# Patient Record
Sex: FEMALE | Race: OTHER
[De-identification: names, ages, dates, MRNs, and addresses within clinical notes are randomized per-mention and may not be internally consistent; named-entity substitution may affect disease eponyms.]

---

## 2018-01-30 ENCOUNTER — APPOINTMENT (OUTPATIENT)
Dept: CARDIOLOGY | Facility: CLINIC | Age: 23
End: 2018-01-30

## 2019-01-23 ENCOUNTER — INPATIENT (INPATIENT)
Facility: HOSPITAL | Age: 24
LOS: 0 days | Discharge: HOME | DRG: 202 | End: 2019-01-24
Attending: INTERNAL MEDICINE | Admitting: INTERNAL MEDICINE
Payer: SELF-PAY

## 2019-01-23 VITALS
OXYGEN SATURATION: 97 % | SYSTOLIC BLOOD PRESSURE: 120 MMHG | WEIGHT: 179.9 LBS | HEART RATE: 112 BPM | HEIGHT: 67 IN | RESPIRATION RATE: 30 BRPM | DIASTOLIC BLOOD PRESSURE: 77 MMHG

## 2019-01-23 DIAGNOSIS — J45.901 UNSPECIFIED ASTHMA WITH (ACUTE) EXACERBATION: ICD-10-CM

## 2019-01-23 DIAGNOSIS — Z90.49 ACQUIRED ABSENCE OF OTHER SPECIFIED PARTS OF DIGESTIVE TRACT: Chronic | ICD-10-CM

## 2019-01-23 LAB
ALBUMIN SERPL ELPH-MCNC: 4.3 G/DL — SIGNIFICANT CHANGE UP (ref 3.3–5.2)
ALP SERPL-CCNC: 85 U/L — SIGNIFICANT CHANGE UP (ref 40–120)
ALT FLD-CCNC: 13 U/L — SIGNIFICANT CHANGE UP
ANION GAP SERPL CALC-SCNC: 15 MMOL/L — SIGNIFICANT CHANGE UP (ref 5–17)
AST SERPL-CCNC: 19 U/L — SIGNIFICANT CHANGE UP
BASOPHILS # BLD AUTO: 0.1 K/UL — SIGNIFICANT CHANGE UP (ref 0–0.2)
BASOPHILS NFR BLD AUTO: 1 % — SIGNIFICANT CHANGE UP (ref 0–2)
BILIRUB SERPL-MCNC: 0.2 MG/DL — LOW (ref 0.4–2)
BUN SERPL-MCNC: 15 MG/DL — SIGNIFICANT CHANGE UP (ref 8–20)
CALCIUM SERPL-MCNC: 8.3 MG/DL — LOW (ref 8.6–10.2)
CHLORIDE SERPL-SCNC: 104 MMOL/L — SIGNIFICANT CHANGE UP (ref 98–107)
CO2 SERPL-SCNC: 19 MMOL/L — LOW (ref 22–29)
CREAT SERPL-MCNC: 0.74 MG/DL — SIGNIFICANT CHANGE UP (ref 0.5–1.3)
EOSINOPHIL # BLD AUTO: 2.7 K/UL — HIGH (ref 0–0.5)
EOSINOPHIL NFR BLD AUTO: 13 % — HIGH (ref 0–5)
ETHANOL SERPL-MCNC: <10 MG/DL — SIGNIFICANT CHANGE UP
GAS PNL BLDA: SIGNIFICANT CHANGE UP
GLUCOSE SERPL-MCNC: 198 MG/DL — HIGH (ref 70–115)
HCG SERPL-ACNC: <4 MIU/ML — SIGNIFICANT CHANGE UP
HCT VFR BLD CALC: 42.6 % — SIGNIFICANT CHANGE UP (ref 37–47)
HGB BLD-MCNC: 14.5 G/DL — SIGNIFICANT CHANGE UP (ref 12–16)
LACTATE BLDV-MCNC: 2.9 MMOL/L — HIGH (ref 0.5–2)
LYMPHOCYTES # BLD AUTO: 39 % — SIGNIFICANT CHANGE UP (ref 20–55)
LYMPHOCYTES # BLD AUTO: 7.9 K/UL — HIGH (ref 1–4.8)
MCHC RBC-ENTMCNC: 28.9 PG — SIGNIFICANT CHANGE UP (ref 27–31)
MCHC RBC-ENTMCNC: 34 G/DL — SIGNIFICANT CHANGE UP (ref 32–36)
MCV RBC AUTO: 84.9 FL — SIGNIFICANT CHANGE UP (ref 81–99)
MONOCYTES # BLD AUTO: 1.2 K/UL — HIGH (ref 0–0.8)
MONOCYTES NFR BLD AUTO: 6 % — SIGNIFICANT CHANGE UP (ref 3–10)
NEUTROPHILS # BLD AUTO: 8.2 K/UL — HIGH (ref 1.8–8)
NEUTROPHILS NFR BLD AUTO: 41 % — SIGNIFICANT CHANGE UP (ref 37–73)
PLAT MORPH BLD: NORMAL — SIGNIFICANT CHANGE UP
PLATELET # BLD AUTO: 333 K/UL — SIGNIFICANT CHANGE UP (ref 150–400)
POTASSIUM SERPL-MCNC: 3.9 MMOL/L — SIGNIFICANT CHANGE UP (ref 3.5–5.3)
POTASSIUM SERPL-SCNC: 3.9 MMOL/L — SIGNIFICANT CHANGE UP (ref 3.5–5.3)
PROT SERPL-MCNC: 7.4 G/DL — SIGNIFICANT CHANGE UP (ref 6.6–8.7)
RAPID RVP RESULT: SIGNIFICANT CHANGE UP
RBC # BLD: 5.02 M/UL — SIGNIFICANT CHANGE UP (ref 4.4–5.2)
RBC # FLD: 13.2 % — SIGNIFICANT CHANGE UP (ref 11–15.6)
RBC BLD AUTO: NORMAL — SIGNIFICANT CHANGE UP
SALICYLATES SERPL-MCNC: <0.6 MG/DL — LOW (ref 10–20)
SODIUM SERPL-SCNC: 138 MMOL/L — SIGNIFICANT CHANGE UP (ref 135–145)
WBC # BLD: 20.4 K/UL — HIGH (ref 4.8–10.8)
WBC # FLD AUTO: 20.4 K/UL — HIGH (ref 4.8–10.8)

## 2019-01-23 PROCEDURE — 93010 ELECTROCARDIOGRAM REPORT: CPT

## 2019-01-23 PROCEDURE — 99223 1ST HOSP IP/OBS HIGH 75: CPT

## 2019-01-23 RX ORDER — KETOROLAC TROMETHAMINE 30 MG/ML
15 SYRINGE (ML) INJECTION ONCE
Qty: 0 | Refills: 0 | Status: DISCONTINUED | OUTPATIENT
Start: 2019-01-23 | End: 2019-01-23

## 2019-01-23 RX ORDER — IPRATROPIUM/ALBUTEROL SULFATE 18-103MCG
3 AEROSOL WITH ADAPTER (GRAM) INHALATION EVERY 6 HOURS
Qty: 0 | Refills: 0 | Status: DISCONTINUED | OUTPATIENT
Start: 2019-01-23 | End: 2019-01-24

## 2019-01-23 RX ORDER — IPRATROPIUM/ALBUTEROL SULFATE 18-103MCG
3 AEROSOL WITH ADAPTER (GRAM) INHALATION ONCE
Qty: 0 | Refills: 0 | Status: COMPLETED | OUTPATIENT
Start: 2019-01-23 | End: 2019-01-23

## 2019-01-23 RX ORDER — SODIUM CHLORIDE 9 MG/ML
2000 INJECTION INTRAMUSCULAR; INTRAVENOUS; SUBCUTANEOUS ONCE
Qty: 0 | Refills: 0 | Status: COMPLETED | OUTPATIENT
Start: 2019-01-23 | End: 2019-01-23

## 2019-01-23 RX ORDER — EPINEPHRINE 0.3 MG/.3ML
0.3 INJECTION INTRAMUSCULAR; SUBCUTANEOUS ONCE
Qty: 0 | Refills: 0 | Status: COMPLETED | OUTPATIENT
Start: 2019-01-23 | End: 2019-01-23

## 2019-01-23 RX ORDER — ALBUTEROL 90 UG/1
2.5 AEROSOL, METERED ORAL EVERY 4 HOURS
Qty: 0 | Refills: 0 | Status: DISCONTINUED | OUTPATIENT
Start: 2019-01-23 | End: 2019-01-24

## 2019-01-23 RX ADMIN — Medication 3 MILLILITER(S): at 20:47

## 2019-01-23 RX ADMIN — SODIUM CHLORIDE 2000 MILLILITER(S): 9 INJECTION INTRAMUSCULAR; INTRAVENOUS; SUBCUTANEOUS at 15:42

## 2019-01-23 RX ADMIN — Medication 40 MILLIGRAM(S): at 18:03

## 2019-01-23 RX ADMIN — EPINEPHRINE 0.3 MILLIGRAM(S): 0.3 INJECTION INTRAMUSCULAR; SUBCUTANEOUS at 14:30

## 2019-01-23 RX ADMIN — SODIUM CHLORIDE 2000 MILLILITER(S): 9 INJECTION INTRAMUSCULAR; INTRAVENOUS; SUBCUTANEOUS at 14:42

## 2019-01-23 RX ADMIN — Medication 3 MILLILITER(S): at 14:26

## 2019-01-23 RX ADMIN — Medication 40 MILLIGRAM(S): at 23:49

## 2019-01-23 RX ADMIN — Medication 3 MILLILITER(S): at 18:03

## 2019-01-23 NOTE — ED PROVIDER NOTE - MEDICAL DECISION MAKING DETAILS
patient with agonal breathing on arrival, significant wheeze, given 2nd dose IM Epi, duoneb, patient became more responsive while preparing for intubation, states 'you are intubating me' trying to use phone. trial BIPAP as patient responding to medical interventions. protecting airway. ABG. labs. monitor.

## 2019-01-23 NOTE — ED ADULT NURSE NOTE - OBJECTIVE STATEMENT
pt BIBA awake, altered, in respiratory distress, given Duoneb, 0.3mg IM Epi, 125mg solumedrol IV as well as 2mg Mag IVPB by EMS. pt with hx asthma, ICU admission for asthma exacerbation. denies intubation history. pt with no recent travel, no recent illness or fevers. pt with increased work of breathing, ER MD brought to  bedside as well as RT.

## 2019-01-23 NOTE — H&P ADULT - NSHPPHYSICALEXAM_GEN_ALL_CORE
T(C): 36.4 (01-23-19 @ 14:39), Max: 36.4 (01-23-19 @ 14:39)  HR: 106 (01-23-19 @ 17:03) (102 - 120)  BP: 111/52 (01-23-19 @ 16:06) (111/52 - 146/82)  RR: 14 (01-23-19 @ 17:03) (12 - 34)  SpO2: 98% (01-23-19 @ 17:03) (97% - 100%)    GEN - appears age appropriate. well nourished. pleasant. no distress.   HEENT - NCAT, EOMI, PRATIMA  RESP - diffuse course rhonchi with scattered prominent wheeze, no stridor/rhonchi/crackles. not on supplemental O2.  CARDIO - NS1S2, RRR. No murmurs/rubs/gallops.  ABD - Soft/Non tender/Non distended. Normal BS x4 quadrants.   Ext - No RADAMES. no signs of venous/arterial stasis ulcers  MSK - full ROM of BL upper and lower extremities without pain or restriction. BL 5/5 strength on upper and lower extremities.   Neuro - cn 2-12 grossly intact. o visible seizure activity appreciated. no tremor. gait not observed.   Skin - clean, dry, intact. no rashes or lesions.    Psych- AAOx3. no suicidal/homicidal ideation. appropriate behaviour. attentive. normal affect.

## 2019-01-23 NOTE — ED PROVIDER NOTE - OBJECTIVE STATEMENT
29yo F with h/o asthma BIBEMS for exacerbation, received 2mg Mg, 125mg solumedrol, Epi IM x1 and duoneb x2, more lethargic now than on .     Pt minimally resposnive, admits to ICU admissions, denies intubations. no other PMH

## 2019-01-23 NOTE — H&P ADULT - ASSESSMENT
30yoF w/ pmh mild-mod persistent asthma presenting w/ acute asthma exac.    #acute exac of mild/mod persistent asthma.  multifactorial, sec to combo of suspected underlying viral uri, chemical irritation from cleaning products, sudden entry into cold air  sp mg iv + solumedrol load by EMS  iv steroids, taper as tolerated  cxr unremarkable, check rvp  nebs prn + standing  supplemental o2 prn, not needed for now  discussed @ length importance of asthma action plan  rescue inhaler to be prescribed on dc, pt only w/ neb machine + symbicort at home  pulm referral on dc, not seen in 2-3 yrs    #metabolic acidosis  noted on abg, unexplained? expecting resp acidosis in setting of asthma exac  pending acetaminophen + salicylate levels  repeat abg, lactate    #dvt ppx. not needed, low risk, encourage ambulation    #dispo. dc home when exac resolved. 30yoF w/ pmh mild-mod persistent asthma presenting w/ acute asthma exac.    #acute exac of mild/mod persistent asthma.  multifactorial, sec to combo of suspected underlying viral uri, chemical irritation from cleaning products, sudden entry into cold air  sp mg iv + solumedrol load by EMS  iv steroids, taper as tolerated  cxr unremarkable, check rvp  nebs prn + standing  supplemental o2 prn, not needed for now  discussed @ length importance of asthma action plan  rescue inhaler to be prescribed on dc, pt only w/ neb machine + symbicort at home  pulm referral on dc, not seen in 2-3 yrs    #metabolic acidosis  noted on abg, unexplained? expecting resp acidosis in setting of asthma exac  pending acetaminophen + salicylate levels  repeat abg, lactate    #leukocytosis + hyperglycemia.   asymptomatic  likely sec to large steroid load given in ems  trend    #dvt ppx. not needed, low risk, encourage ambulation    #dispo. dc home when exac resolved.

## 2019-01-23 NOTE — H&P ADULT - HISTORY OF PRESENT ILLNESS
30yoF w/ pmh mild-mod persistent asthma presenting w/ acute asthma exac. 2 days ago she began experiencing cough assoc w/ nasal congestion, mild upper back pain, odynophagia and sob at which time she began increased use of her albuterol inhaler. on the day of admission was cleaning her home when the scent of the chemicals began to potentiate the sob that she was already having. she attempted to use her neb again but to no avail prompting call to EMS. upon exiting the home to go to the stretcher pt became acutely dyspneic w/ worsening bronchospasm and was bagged by ems to maintain oxygenation. was given iv access immediately and given 2gm mg + 125mg of solumedrol. in er pt was placed on bipap x1hr and given additional nebs. bipap removed and patient continued to improve. she denies fevers, admits to chills, denies recent sick contacts. states that she did get the flu shot this season. denies chest pain, palpitations. admits to nausea w/o diarrhea or vomiting. denies weakness, fatigue. last asthma exac 12/2018 treated @ [facility], prior to that last hospitalization for exac was 4-5yrs ago. no hx of intubation.

## 2019-01-23 NOTE — H&P ADULT - NSHPLABSRESULTS_GEN_ALL_CORE
01-23    138  |  104  |  15.0  ----------------------------<  198<H>  3.9   |  19.0<L>  |  0.74    Ca    8.3<L>      23 Jan 2019 14:13    TPro  7.4  /  Alb  4.3  /  TBili  0.2<L>  /  DBili  x   /  AST  19  /  ALT  13  /  AlkPhos  85  01-23                          14.5   20.4  )-----------( 333      ( 23 Jan 2019 14:13 )             42.6     LIVER FUNCTIONS - ( 23 Jan 2019 14:13 )  Alb: 4.3 g/dL / Pro: 7.4 g/dL / ALK PHOS: 85 U/L / ALT: 13 U/L / AST: 19 U/L / GGT: x    Blood Gas Arterial, Lactate (01.23.19 @ 14:15)    Blood Gas Arterial, Lactate: 2.5 mmoL/L    Blood Gas Profile - Arterial (01.23.19 @ 14:15)    pH, Arterial: 7.24    pCO2, Arterial: 44 mmHg    pO2, Arterial: 214 mmHg    HCO3, Arterial: 18 mmoL/L    Base Excess, Arterial: -8.4 mmol/L    Oxygen Saturation, Arterial: 100 %    FIO2, Arterial: 6 lpm    Blood Gas Comments Arterial: dn 6 lpm    Blood Gas Source Arterial: Arterial

## 2019-01-23 NOTE — H&P ADULT - NSHPSOCIALHISTORY_GEN_ALL_CORE
Family HX:       Mother - no hx asthma       Father - no hx asthma    Denies tobacco use, social ETOH use, admits to former marijuana use, last smoked last summer, denies other illicit drug/misuse of prescription medications

## 2019-01-23 NOTE — ED PROVIDER NOTE - PHYSICAL EXAMINATION
Gen: moderate resp distress, agonal breathing, somnolent, responds to physical stimuli  Head: NCAT  HEENT: EOMI, oral mucosa moist, normal conjunctiva, neck supple  Lung: tachypneic, moderate air entry, diffuse wheeze  CV: tachy regular  Abd: soft, NTND  MSK: No edema, no visible deformities  Neuro: moving all extremities   Skin: No rash

## 2019-01-23 NOTE — H&P ADULT - ATTENDING COMMENTS
PMD contacted and made aware of hospital admittance . Follow up appointment advised within 7 days of discharge, per PMD she can be seen as walk in to accommodate her    At this time the patient is in a hemodynamic state that requires hospital level of care/treatment. This has been explained to the patient and they are agreeable to the terms of admittance and continued care.

## 2019-01-23 NOTE — ED PROVIDER NOTE - PROGRESS NOTE DETAILS
patient improved MS, decreased WOB on BIPAP, ABG no hypercapnea, mild met acidosis. will trial off BIPAP -Slowey DO doing well off BIPAP, awake and alert, on phone, no distress, still with wheeze but improved aeration. no resp distress -Slowey DO remains stable, has not required any supplemental O2, complaint of mild chest tightness, no SOB. still with faint wheeze, will give duoneb q4 admit to pulse ox floor -Slowey DO lactate elevated in setting of 2 doses of epinephrine -Michelle DO

## 2019-01-23 NOTE — ED ADULT NURSE NOTE - NSIMPLEMENTINTERV_GEN_ALL_ED
Implemented All Universal Safety Interventions:  [place] to call system. Call bell, personal items and telephone within reach. Instruct patient to call for assistance. Room bathroom lighting operational. Non-slip footwear when patient is off stretcher. Physically safe environment: no spills, clutter or unnecessary equipment. Stretcher in lowest position, wheels locked, appropriate side rails in place.

## 2019-01-23 NOTE — ED ADULT TRIAGE NOTE - CHIEF COMPLAINT QUOTE
sudden onset asthma exac, as per EMS was given epi, solumedrol, magnesium, and duonebs with no relief, code team and respiratory at bedside upon ED arrival, preparing for intubation

## 2019-01-24 VITALS
DIASTOLIC BLOOD PRESSURE: 54 MMHG | RESPIRATION RATE: 18 BRPM | SYSTOLIC BLOOD PRESSURE: 91 MMHG | OXYGEN SATURATION: 94 % | TEMPERATURE: 99 F | HEART RATE: 118 BPM

## 2019-01-24 LAB
ANISOCYTOSIS BLD QL: SLIGHT — SIGNIFICANT CHANGE UP
ELLIPTOCYTES BLD QL SMEAR: SLIGHT — SIGNIFICANT CHANGE UP
HCT VFR BLD CALC: 38.3 % — SIGNIFICANT CHANGE UP (ref 37–47)
HGB BLD-MCNC: 13.2 G/DL — SIGNIFICANT CHANGE UP (ref 12–16)
LACTATE BLDV-MCNC: 0.9 MMOL/L — SIGNIFICANT CHANGE UP (ref 0.5–2)
LYMPHOCYTES # BLD AUTO: 5 % — LOW (ref 20–55)
MACROCYTES BLD QL: SLIGHT — SIGNIFICANT CHANGE UP
MCHC RBC-ENTMCNC: 28.2 PG — SIGNIFICANT CHANGE UP (ref 27–31)
MCHC RBC-ENTMCNC: 34.5 G/DL — SIGNIFICANT CHANGE UP (ref 32–36)
MCV RBC AUTO: 81.8 FL — SIGNIFICANT CHANGE UP (ref 81–99)
MICROCYTES BLD QL: SLIGHT — SIGNIFICANT CHANGE UP
MONOCYTES NFR BLD AUTO: 3 % — SIGNIFICANT CHANGE UP (ref 3–10)
NEUTROPHILS NFR BLD AUTO: 91 % — HIGH (ref 37–73)
PLAT MORPH BLD: NORMAL — SIGNIFICANT CHANGE UP
PLATELET # BLD AUTO: 318 K/UL — SIGNIFICANT CHANGE UP (ref 150–400)
POIKILOCYTOSIS BLD QL AUTO: SLIGHT — SIGNIFICANT CHANGE UP
RBC # BLD: 4.68 M/UL — SIGNIFICANT CHANGE UP (ref 4.4–5.2)
RBC # FLD: 13.2 % — SIGNIFICANT CHANGE UP (ref 11–15.6)
RBC BLD AUTO: ABNORMAL
VARIANT LYMPHS # BLD: 1 % — SIGNIFICANT CHANGE UP (ref 0–6)
WBC # BLD: 21.6 K/UL — HIGH (ref 4.8–10.8)
WBC # FLD AUTO: 21.6 K/UL — HIGH (ref 4.8–10.8)

## 2019-01-24 PROCEDURE — 71045 X-RAY EXAM CHEST 1 VIEW: CPT

## 2019-01-24 PROCEDURE — 96361 HYDRATE IV INFUSION ADD-ON: CPT

## 2019-01-24 PROCEDURE — 82803 BLOOD GASES ANY COMBINATION: CPT

## 2019-01-24 PROCEDURE — 82330 ASSAY OF CALCIUM: CPT

## 2019-01-24 PROCEDURE — 87633 RESP VIRUS 12-25 TARGETS: CPT

## 2019-01-24 PROCEDURE — 84295 ASSAY OF SERUM SODIUM: CPT

## 2019-01-24 PROCEDURE — 94660 CPAP INITIATION&MGMT: CPT

## 2019-01-24 PROCEDURE — 84702 CHORIONIC GONADOTROPIN TEST: CPT

## 2019-01-24 PROCEDURE — 96372 THER/PROPH/DIAG INJ SC/IM: CPT | Mod: XU

## 2019-01-24 PROCEDURE — 85014 HEMATOCRIT: CPT

## 2019-01-24 PROCEDURE — 85027 COMPLETE CBC AUTOMATED: CPT

## 2019-01-24 PROCEDURE — 93005 ELECTROCARDIOGRAM TRACING: CPT

## 2019-01-24 PROCEDURE — 82947 ASSAY GLUCOSE BLOOD QUANT: CPT

## 2019-01-24 PROCEDURE — 99239 HOSP IP/OBS DSCHRG MGMT >30: CPT

## 2019-01-24 PROCEDURE — 83605 ASSAY OF LACTIC ACID: CPT

## 2019-01-24 PROCEDURE — 80307 DRUG TEST PRSMV CHEM ANLYZR: CPT

## 2019-01-24 PROCEDURE — 84132 ASSAY OF SERUM POTASSIUM: CPT

## 2019-01-24 PROCEDURE — 87798 DETECT AGENT NOS DNA AMP: CPT

## 2019-01-24 PROCEDURE — 87581 M.PNEUMON DNA AMP PROBE: CPT

## 2019-01-24 PROCEDURE — 36415 COLL VENOUS BLD VENIPUNCTURE: CPT

## 2019-01-24 PROCEDURE — 87486 CHLMYD PNEUM DNA AMP PROBE: CPT

## 2019-01-24 PROCEDURE — 94640 AIRWAY INHALATION TREATMENT: CPT

## 2019-01-24 PROCEDURE — 82435 ASSAY OF BLOOD CHLORIDE: CPT

## 2019-01-24 PROCEDURE — 99291 CRITICAL CARE FIRST HOUR: CPT | Mod: 25

## 2019-01-24 PROCEDURE — 80053 COMPREHEN METABOLIC PANEL: CPT

## 2019-01-24 PROCEDURE — 96374 THER/PROPH/DIAG INJ IV PUSH: CPT

## 2019-01-24 PROCEDURE — 82962 GLUCOSE BLOOD TEST: CPT

## 2019-01-24 RX ORDER — IPRATROPIUM/ALBUTEROL SULFATE 18-103MCG
3 AEROSOL WITH ADAPTER (GRAM) INHALATION
Qty: 360 | Refills: 0 | OUTPATIENT
Start: 2019-01-24 | End: 2019-01-28

## 2019-01-24 RX ORDER — SODIUM CHLORIDE 9 MG/ML
2000 INJECTION INTRAMUSCULAR; INTRAVENOUS; SUBCUTANEOUS ONCE
Qty: 0 | Refills: 0 | Status: COMPLETED | OUTPATIENT
Start: 2019-01-24 | End: 2019-01-24

## 2019-01-24 RX ORDER — ALBUTEROL 90 UG/1
2 AEROSOL, METERED ORAL
Qty: 1 | Refills: 0 | OUTPATIENT
Start: 2019-01-24 | End: 2019-02-22

## 2019-01-24 RX ADMIN — SODIUM CHLORIDE 500 MILLILITER(S): 9 INJECTION INTRAMUSCULAR; INTRAVENOUS; SUBCUTANEOUS at 11:58

## 2019-01-24 RX ADMIN — Medication 40 MILLIGRAM(S): at 17:26

## 2019-01-24 RX ADMIN — Medication 3 MILLILITER(S): at 15:22

## 2019-01-24 RX ADMIN — Medication 3 MILLILITER(S): at 03:50

## 2019-01-24 RX ADMIN — Medication 40 MILLIGRAM(S): at 05:21

## 2019-01-24 RX ADMIN — Medication 40 MILLIGRAM(S): at 11:58

## 2019-01-24 NOTE — DISCHARGE NOTE ADULT - CARE PROVIDERS DIRECT ADDRESSES
[email or web address]

## 2019-01-24 NOTE — DISCHARGE NOTE ADULT - CARE PLAN
Principal Discharge DX:	Moderate persistent asthma with acute exacerbation  Goal:	stable  Assessment and plan of treatment:	Be sure to take all asthma medications as prescribed, it is important that you are consistent and do not miss taking the ones that are meant to be taken daily, even if your breathing already feels well. If you have been prescribed steroids, also be sure to take those are prescribed. Be sure to follow up with your Primary Care Doctor or a Pulmonologist if you have one. If you do not already have an asthma action plan, please discuss establishing one with your outpatient Doctors.  Secondary Diagnosis:	Metabolic acidosis  Goal:	follow up  Assessment and plan of treatment:	You were noticed to have blood work with some abnormalities. At this time, you are safe to leave the hospital, we do not suspect that anything immediately will come of these findings, but it is something that should be followed to see if it is getting better, staying the same, or getting worse. Be sure to discuss this at your follow up visit with your Primary Care Doctor to have these tests repeated and to see what work up, if any, is necessary to continue managing it.  Secondary Diagnosis:	Leukocytosis, unspecified type  Goal:	likely due to steroids  Assessment and plan of treatment:	follow with your primary care doctor  Secondary Diagnosis:	Hyperglycemia  Goal:	likely due to steroids  Assessment and plan of treatment:	follow with your primary care doctor

## 2019-01-24 NOTE — DISCHARGE NOTE ADULT - CARE PROVIDER_API CALL
Joe Deleon), Critical Care Medicine; Internal Medicine; Pulmonary Disease  [address]  [place] [address]  Phone: [phone]  Fax: [phone]    Malcolm Elise), Pediatrics  [address] [place] [address]  Phone: [phone]  Fax: [phone]

## 2019-01-24 NOTE — DISCHARGE NOTE ADULT - PATIENT PORTAL LINK FT
You can access the [facility] Patient Portal, offered by [facility], by registering with the following website: [email or web address]

## 2019-01-24 NOTE — DISCHARGE NOTE ADULT - OTHER SIGNIFICANT FINDINGS
01-23    138  |  104  |  15.0  ----------------------------<  198<H>  3.9   |  19.0<L>  |  0.74    Ca    8.3<L>      23 Jan 2019 14:13    TPro  7.4  /  Alb  4.3  /  TBili  0.2<L>  /  DBili  x   /  AST  19  /  ALT  13  /  AlkPhos  85  01-23                          13.2   21.6  )-----------( 318      ( 24 Jan 2019 07:45 )             38.3     LIVER FUNCTIONS - ( 23 Jan 2019 14:13 )  Alb: 4.3 g/dL / Pro: 7.4 g/dL / ALK PHOS: 85 U/L / ALT: 13 U/L / AST: 19 U/L / GGT: x           Rapid RVP Result: NotDetec:     < from: Xray Chest 1 View- PORTABLE-Urgent (01.23.19 @ 16:34) >     FINDINGS:      Visualized lung fields are clear. Cardiac and mediastinal silhouette is   within normal limits for AP projection. No pulmonary venous congestion is   seen. There is no significant bony abnormality.      IMPRESSION:     Negative study.    < end of copied text >

## 2019-01-24 NOTE — DISCHARGE NOTE ADULT - MEDICATION SUMMARY - MEDICATIONS TO TAKE
I will START or STAY ON the medications listed below when I get home from the hospital:    predniSONE 20 mg oral tablet  -- 3 tab(s) by mouth once a day   -- It is very important that you take or use this exactly as directed.  Do not skip doses or discontinue unless directed by your doctor.  Obtain medical advice before taking any non-prescription drugs as some may affect the action of this medication.  Take with food or milk.    -- Indication: For Asthma with acute exacerbation    ipratropium-albuterol 0.5 mg-2.5 mg/3 mLinhalation solution  -- 3 milliliter(s) inhaled every 6 hours for 5 days or until you feel better  -- Indication: For Asthma with acute exacerbation    albuterol 90 mcg/inh inhalation aerosol  -- 2 puff(s) inhaled every 4 hours, As Needed -for shortness of breath and/or wheezing   -- For inhalation only.  It is very important that you take or use this exactly as directed.  Do not skip doses or discontinue unless directed by your doctor.  Obtain medical advice before taking any non-prescription drugs as some may affect the action of this medication.  Shake well before use.    -- Indication: For Asthma    Ventolin Nebules 2.5 mg/3 mL (0.083%) inhalation solution  -- 3 milliliter(s) inhaled every 6 hours, As Needed  -- Indication: For Asthma    Symbicort 80 mcg-4.5 mcg/inh inhalation aerosol  -- 2 puff(s) inhaled 2 times a day  -- Indication: For Asthma

## 2019-01-24 NOTE — DISCHARGE NOTE ADULT - ADDITIONAL INSTRUCTIONS
-Follow up with Primary Care Doctor within 1 week  -Follow up with Pulmonology (Lung doctor) in 1 week

## 2019-01-24 NOTE — DISCHARGE NOTE ADULT - PLAN OF CARE
stable Be sure to take all asthma medications as prescribed, it is important that you are consistent and do not miss taking the ones that are meant to be taken daily, even if your breathing already feels well. If you have been prescribed steroids, also be sure to take those are prescribed. Be sure to follow up with your Primary Care Doctor or a Pulmonologist if you have one. If you do not already have an asthma action plan, please discuss establishing one with your outpatient Doctors. follow up You were noticed to have blood work with some abnormalities. At this time, you are safe to leave the hospital, we do not suspect that anything immediately will come of these findings, but it is something that should be followed to see if it is getting better, staying the same, or getting worse. Be sure to discuss this at your follow up visit with your Primary Care Doctor to have these tests repeated and to see what work up, if any, is necessary to continue managing it. likely due to steroids follow with your primary care doctor

## 2019-01-24 NOTE — DISCHARGE NOTE ADULT - HOSPITAL COURSE
30yoF w/ pmh mild-mod persistent asthma presenting w/ acute asthma exac.    #acute exac of mild/mod persistent asthma.  stable  multifactorial, sec to combo of suspected underlying viral uri, chemical irritation from cleaning products, sudden entry into cold air  sp mg iv + solumedrol load by EMS  iv steroids well tolerated, change to oral, 60mg prednisone x5d on dc  cxr unremarkable, neg rvp  nebs prn + standing, prescriptions for duoneb given to cont @ home for the next few days in addition to home alb neb  discussed @ length importance of asthma action plan  rescue inhaler prescribed on dc, pt only w/ neb machine + symbicort at home  pulm referral on dc, not seen in 2-3 yrs  advised to f/u w/ pmd also  discussed w/ pt @ length at bedside, she would like to go home, will stay for a few more nebs + iv steroid dose and dc home on regimen as explained.     #metabolic acidosis  noted on abg, unexplained? expecting resp acidosis in setting of asthma exac  unremarkable acetaminophen + salicylate levels  repeat lactate w/ normalization  outpt fu    #leukocytosis + hyperglycemia.   asymptomatic  likely sec to large steroid load given in ems, outpt fu    Vital Signs Last 24 Hrs  T(C): 37.1 (24 Jan 2019 11:00), Max: 37.2 (23 Jan 2019 22:00)  T(F): 98.8 (24 Jan 2019 11:00), Max: 98.9 (23 Jan 2019 22:00)  HR: 96 (24 Jan 2019 11:00) (72 - 118)  BP: 114/73 (24 Jan 2019 11:00) (93/56 - 124/60)  BP(mean): --  RR: 18 (24 Jan 2019 11:00) (12 - 18)  SpO2: 93% (24 Jan 2019 11:00) (92% - 98%)    PHYSICAL EXAM.    GEN - appears age appropriate. well nourished. pleasant. no distress.   HEENT - NCAT, EOMI, PRATIMA, no JVD/bruit.  RESP - faint scattered wheeze, mainly in upper lobes, aeration restored compared to prior exam, no rhonchi or crackles. not on supplemental O2.  CARDIO - NS1S2, RRR. No murmurs/rubs/gallops.  ABD - Soft/Non tender/Non distended. Normal BS x4 quadrants.   Ext - No RADAMES.  MSK - BL 5/5 strength on upper and lower extremities.   Neuro - AAOx3.     All electrolyte abnormalities were monitored carefully and repleted as necessary during this hospitalization. At the time of discharge patient was hemodynamically stable and amenable to all terms of discharge. The patient has received verbal instructions from myself regarding discharge plans.     Length of Discharge: 45MIN 30yoF w/ pmh mild-mod persistent asthma presenting w/ acute asthma exac.    #acute exac of mild/mod persistent asthma.  stable  multifactorial, sec to combo of suspected underlying viral uri, chemical irritation from cleaning products, sudden entry into cold air  sp mg iv + solumedrol load by EMS  iv steroids well tolerated, change to oral, 60mg prednisone x5d on dc  cxr unremarkable, neg rvp  nebs prn + standing, prescriptions for duoneb given to cont @ home for the next few days in addition to home alb neb  discussed @ length importance of asthma action plan  rescue inhaler prescribed on dc, pt only w/ neb machine + symbicort at home  pulm referral on dc, not seen in 2-3 yrs  advised to f/u w/ pmd also  discussed w/ pt @ length at bedside, she would like to go home, will stay for a few more nebs + iv steroid dose and dc home on regimen as explained.     #metabolic acidosis  noted on abg, unexplained? expecting resp acidosis in setting of asthma exac  unremarkable acetaminophen + salicylate levels  repeat lactate w/ normalization  outpt fu    #leukocytosis + hyperglycemia.   asymptomatic  likely sec to large steroid load given in ems, outpt fu    Vital Signs Last 24 Hrs  T(C): 37.1 (24 Jan 2019 11:00), Max: 37.2 (23 Jan 2019 22:00)  T(F): 98.8 (24 Jan 2019 11:00), Max: 98.9 (23 Jan 2019 22:00)  HR: 96 (24 Jan 2019 11:00) (72 - 118)  BP: 114/73 (24 Jan 2019 11:00) (93/56 - 124/60)  BP(mean): --  RR: 18 (24 Jan 2019 11:00) (12 - 18)  SpO2: 93% (24 Jan 2019 11:00) (92% - 98%)    PHYSICAL EXAM.    GEN - appears age appropriate. well nourished. pleasant. no distress.   HEENT - NCAT, EOMI, PRATIMA, no JVD/bruit.  RESP - faint scattered wheeze, mainly in upper lobes noted earlier in day, resolved on reexamination. aeration restored compared to prior exam yesterday, no rhonchi or crackles. not on supplemental O2.  CARDIO - NS1S2, RRR. No murmurs/rubs/gallops.  ABD - Soft/Non tender/Non distended. Normal BS x4 quadrants.   Ext - No RADAMES.  MSK - BL 5/5 strength on upper and lower extremities.   Neuro - AAOx3.     All electrolyte abnormalities were monitored carefully and repleted as necessary during this hospitalization. At the time of discharge patient was hemodynamically stable and amenable to all terms of discharge. The patient has received verbal instructions from myself regarding discharge plans.     Length of Discharge: 45MIN

## 2019-01-25 RX ORDER — BUDESONIDE AND FORMOTEROL FUMARATE DIHYDRATE 160; 4.5 UG/1; UG/1
2 AEROSOL RESPIRATORY (INHALATION)
Qty: 0 | Refills: 0 | COMMUNITY

## 2019-01-25 RX ORDER — ALBUTEROL 90 UG/1
3 AEROSOL, METERED ORAL
Qty: 0 | Refills: 0 | COMMUNITY

## 2019-01-28 ENCOUNTER — EMERGENCY (EMERGENCY)
Facility: HOSPITAL | Age: 24
LOS: 1 days | Discharge: HOME | End: 2019-01-28
Attending: EMERGENCY MEDICINE
Payer: SELF-PAY

## 2019-01-28 VITALS
HEART RATE: 118 BPM | SYSTOLIC BLOOD PRESSURE: 141 MMHG | WEIGHT: 179.9 LBS | HEIGHT: 64 IN | TEMPERATURE: 98 F | DIASTOLIC BLOOD PRESSURE: 86 MMHG | RESPIRATION RATE: 22 BRPM | OXYGEN SATURATION: 95 %

## 2019-01-28 VITALS
OXYGEN SATURATION: 96 % | DIASTOLIC BLOOD PRESSURE: 78 MMHG | SYSTOLIC BLOOD PRESSURE: 132 MMHG | RESPIRATION RATE: 20 BRPM | HEART RATE: 105 BPM

## 2019-01-28 DIAGNOSIS — Z90.49 ACQUIRED ABSENCE OF OTHER SPECIFIED PARTS OF DIGESTIVE TRACT: Chronic | ICD-10-CM

## 2019-01-28 PROBLEM — J45.909 UNSPECIFIED ASTHMA, UNCOMPLICATED: Chronic | Status: ACTIVE | Noted: 2019-01-23

## 2019-01-28 LAB
ALBUMIN SERPL ELPH-MCNC: 4.1 G/DL — SIGNIFICANT CHANGE UP (ref 3.3–5.2)
ALP SERPL-CCNC: 76 U/L — SIGNIFICANT CHANGE UP (ref 40–120)
ALT FLD-CCNC: 17 U/L — SIGNIFICANT CHANGE UP
ANION GAP SERPL CALC-SCNC: 9 MMOL/L — SIGNIFICANT CHANGE UP (ref 5–17)
AST SERPL-CCNC: 13 U/L — SIGNIFICANT CHANGE UP
BASE EXCESS BLDV CALC-SCNC: -1.9 MMOL/L — SIGNIFICANT CHANGE UP (ref -2–2)
BASOPHILS # BLD AUTO: 0 K/UL — SIGNIFICANT CHANGE UP (ref 0–0.2)
BASOPHILS NFR BLD AUTO: 0.2 % — SIGNIFICANT CHANGE UP (ref 0–2)
BILIRUB SERPL-MCNC: 0.2 MG/DL — LOW (ref 0.4–2)
BUN SERPL-MCNC: 15 MG/DL — SIGNIFICANT CHANGE UP (ref 8–20)
CA-I SERPL-SCNC: 1.14 MMOL/L — LOW (ref 1.15–1.33)
CALCIUM SERPL-MCNC: 8.8 MG/DL — SIGNIFICANT CHANGE UP (ref 8.6–10.2)
CHLORIDE BLDV-SCNC: 108 MMOL/L — HIGH (ref 98–107)
CHLORIDE SERPL-SCNC: 107 MMOL/L — SIGNIFICANT CHANGE UP (ref 98–107)
CO2 SERPL-SCNC: 25 MMOL/L — SIGNIFICANT CHANGE UP (ref 22–29)
CREAT SERPL-MCNC: 0.54 MG/DL — SIGNIFICANT CHANGE UP (ref 0.5–1.3)
EOSINOPHIL # BLD AUTO: 1.8 K/UL — HIGH (ref 0–0.5)
EOSINOPHIL NFR BLD AUTO: 14.3 % — HIGH (ref 0–6)
GAS PNL BLDV: 142 MMOL/L — SIGNIFICANT CHANGE UP (ref 135–145)
GAS PNL BLDV: SIGNIFICANT CHANGE UP
GAS PNL BLDV: SIGNIFICANT CHANGE UP
GLUCOSE BLDV-MCNC: 96 MG/DL — SIGNIFICANT CHANGE UP (ref 70–99)
GLUCOSE SERPL-MCNC: 100 MG/DL — SIGNIFICANT CHANGE UP (ref 70–115)
HCO3 BLDV-SCNC: 23 MMOL/L — SIGNIFICANT CHANGE UP (ref 20–26)
HCT VFR BLD CALC: 40.2 % — SIGNIFICANT CHANGE UP (ref 37–47)
HCT VFR BLDA CALC: 44 — SIGNIFICANT CHANGE UP (ref 39–50)
HGB BLD CALC-MCNC: 14.5 G/DL — SIGNIFICANT CHANGE UP (ref 11.5–15.5)
HGB BLD-MCNC: 13.7 G/DL — SIGNIFICANT CHANGE UP (ref 12–16)
LACTATE BLDV-MCNC: 0.9 MMOL/L — SIGNIFICANT CHANGE UP (ref 0.5–2)
LYMPHOCYTES # BLD AUTO: 28.2 % — SIGNIFICANT CHANGE UP (ref 20–55)
LYMPHOCYTES # BLD AUTO: 3.5 K/UL — SIGNIFICANT CHANGE UP (ref 1–4.8)
MCHC RBC-ENTMCNC: 28.7 PG — SIGNIFICANT CHANGE UP (ref 27–31)
MCHC RBC-ENTMCNC: 34.1 G/DL — SIGNIFICANT CHANGE UP (ref 32–36)
MCV RBC AUTO: 84.3 FL — SIGNIFICANT CHANGE UP (ref 81–99)
MONOCYTES # BLD AUTO: 0.6 K/UL — SIGNIFICANT CHANGE UP (ref 0–0.8)
MONOCYTES NFR BLD AUTO: 4.7 % — SIGNIFICANT CHANGE UP (ref 3–10)
NEUTROPHILS # BLD AUTO: 6.5 K/UL — SIGNIFICANT CHANGE UP (ref 1.8–8)
NEUTROPHILS NFR BLD AUTO: 52.2 % — SIGNIFICANT CHANGE UP (ref 37–73)
OTHER CELLS CSF MANUAL: 19 ML/DL — SIGNIFICANT CHANGE UP (ref 18–22)
PCO2 BLDV: 49 MMHG — SIGNIFICANT CHANGE UP (ref 35–50)
PH BLDV: 7.31 — LOW (ref 7.32–7.43)
PLATELET # BLD AUTO: 283 K/UL — SIGNIFICANT CHANGE UP (ref 150–400)
PO2 BLDV: 88 MMHG — HIGH (ref 25–45)
POTASSIUM BLDV-SCNC: 3.7 MMOL/L — SIGNIFICANT CHANGE UP (ref 3.4–4.5)
POTASSIUM SERPL-MCNC: 3.7 MMOL/L — SIGNIFICANT CHANGE UP (ref 3.5–5.3)
POTASSIUM SERPL-SCNC: 3.7 MMOL/L — SIGNIFICANT CHANGE UP (ref 3.5–5.3)
PROT SERPL-MCNC: 7.1 G/DL — SIGNIFICANT CHANGE UP (ref 6.6–8.7)
RBC # BLD: 4.77 M/UL — SIGNIFICANT CHANGE UP (ref 4.4–5.2)
RBC # FLD: 13.2 % — SIGNIFICANT CHANGE UP (ref 11–15.6)
SAO2 % BLDV: 96 % — SIGNIFICANT CHANGE UP
SODIUM SERPL-SCNC: 141 MMOL/L — SIGNIFICANT CHANGE UP (ref 135–145)
WBC # BLD: 12.4 K/UL — HIGH (ref 4.8–10.8)
WBC # FLD AUTO: 12.4 K/UL — HIGH (ref 4.8–10.8)

## 2019-01-28 PROCEDURE — 82947 ASSAY GLUCOSE BLOOD QUANT: CPT

## 2019-01-28 PROCEDURE — 99285 EMERGENCY DEPT VISIT HI MDM: CPT | Mod: 25

## 2019-01-28 PROCEDURE — 94640 AIRWAY INHALATION TREATMENT: CPT

## 2019-01-28 PROCEDURE — 82330 ASSAY OF CALCIUM: CPT

## 2019-01-28 PROCEDURE — 96365 THER/PROPH/DIAG IV INF INIT: CPT

## 2019-01-28 PROCEDURE — 99053 MED SERV 10PM-8AM 24 HR FAC: CPT

## 2019-01-28 PROCEDURE — 83605 ASSAY OF LACTIC ACID: CPT

## 2019-01-28 PROCEDURE — 96361 HYDRATE IV INFUSION ADD-ON: CPT

## 2019-01-28 PROCEDURE — 99284 EMERGENCY DEPT VISIT MOD MDM: CPT | Mod: 25

## 2019-01-28 PROCEDURE — 96375 TX/PRO/DX INJ NEW DRUG ADDON: CPT

## 2019-01-28 PROCEDURE — 36415 COLL VENOUS BLD VENIPUNCTURE: CPT

## 2019-01-28 PROCEDURE — 84132 ASSAY OF SERUM POTASSIUM: CPT

## 2019-01-28 PROCEDURE — 82435 ASSAY OF BLOOD CHLORIDE: CPT

## 2019-01-28 PROCEDURE — 84295 ASSAY OF SERUM SODIUM: CPT

## 2019-01-28 PROCEDURE — 85014 HEMATOCRIT: CPT

## 2019-01-28 PROCEDURE — 85027 COMPLETE CBC AUTOMATED: CPT

## 2019-01-28 PROCEDURE — 82803 BLOOD GASES ANY COMBINATION: CPT

## 2019-01-28 PROCEDURE — 80053 COMPREHEN METABOLIC PANEL: CPT

## 2019-01-28 RX ORDER — SODIUM CHLORIDE 9 MG/ML
1000 INJECTION INTRAMUSCULAR; INTRAVENOUS; SUBCUTANEOUS ONCE
Qty: 0 | Refills: 0 | Status: COMPLETED | OUTPATIENT
Start: 2019-01-28 | End: 2019-01-28

## 2019-01-28 RX ORDER — MAGNESIUM SULFATE 500 MG/ML
2 VIAL (ML) INJECTION ONCE
Qty: 0 | Refills: 0 | Status: COMPLETED | OUTPATIENT
Start: 2019-01-28 | End: 2019-01-28

## 2019-01-28 RX ORDER — ALBUTEROL 90 UG/1
2.5 AEROSOL, METERED ORAL ONCE
Qty: 0 | Refills: 0 | Status: COMPLETED | OUTPATIENT
Start: 2019-01-28 | End: 2019-01-28

## 2019-01-28 RX ORDER — IPRATROPIUM/ALBUTEROL SULFATE 18-103MCG
3 AEROSOL WITH ADAPTER (GRAM) INHALATION
Qty: 0 | Refills: 0 | Status: COMPLETED | OUTPATIENT
Start: 2019-01-28 | End: 2019-01-28

## 2019-01-28 RX ORDER — IPRATROPIUM/ALBUTEROL SULFATE 18-103MCG
3 AEROSOL WITH ADAPTER (GRAM) INHALATION ONCE
Qty: 0 | Refills: 0 | Status: COMPLETED | OUTPATIENT
Start: 2019-01-28 | End: 2019-01-28

## 2019-01-28 RX ADMIN — Medication 2 GRAM(S): at 07:12

## 2019-01-28 RX ADMIN — SODIUM CHLORIDE 1000 MILLILITER(S): 9 INJECTION INTRAMUSCULAR; INTRAVENOUS; SUBCUTANEOUS at 09:12

## 2019-01-28 RX ADMIN — Medication 50 GRAM(S): at 06:20

## 2019-01-28 RX ADMIN — SODIUM CHLORIDE 1000 MILLILITER(S): 9 INJECTION INTRAMUSCULAR; INTRAVENOUS; SUBCUTANEOUS at 06:23

## 2019-01-28 RX ADMIN — Medication 3 MILLILITER(S): at 09:12

## 2019-01-28 RX ADMIN — Medication 3 MILLILITER(S): at 06:28

## 2019-01-28 RX ADMIN — Medication 125 MILLIGRAM(S): at 06:20

## 2019-01-28 RX ADMIN — ALBUTEROL 2.5 MILLIGRAM(S): 90 AEROSOL, METERED ORAL at 06:29

## 2019-01-28 RX ADMIN — Medication 3 MILLILITER(S): at 07:12

## 2019-01-28 RX ADMIN — ALBUTEROL 2.5 MILLIGRAM(S): 90 AEROSOL, METERED ORAL at 07:12

## 2019-01-28 NOTE — ED PROVIDER NOTE - OBJECTIVE STATEMENT
Patient is a 24 y/o female with a pmhx of asthma presenting with difficulty breathing. Patient was seen at [facility] four days ago for similar symptoms. Patient was admitted to the hospital, discharged home with prednisone and solution for nebuilzer. Patient reports minimal improvement, states tonight she started having difficulty breathing when laying in bed. Patient admits to assoicated chest tightness. Patient states symptoms are better than four days ago. Patient admits to past hospitalizations, denies intubations in the past. Patient denies fevers, abdominal pain, nausea, vomiting, headache.

## 2019-01-28 NOTE — ED PROVIDER NOTE - PROGRESS NOTE DETAILS
will evaluate response to nebs iv meds and am attending aware of need for reassessment Hernandez: patient received total of duoneb x 2, albuterol x 2, solumedrol 125mg IV, magnesium 2 g IV. patient report feeling better, minimal wheeze, speaks in full sentenses, 99% on RA. Patient is tahycardic, likely secondary to multiple dose of albuterol. She feels comfortable going home.

## 2019-01-28 NOTE — ED PROVIDER NOTE - PHYSICAL EXAMINATION
Const: Awake, alert and oriented. Well appearing.  HEENT: NC/AT. Moist mucous membranes.  Eyes: Conjunctiva pink, sclera white bilaterally EOMI.  Neck:. Soft and supple. Full ROM without pain.  Cardiac: +S1/S2. No murmurs.   Resp: Wheezing noted in a lung bases   Abd: Soft, non-tender, non-distended. Normal bowel sounds in all 4 quadrants. No guarding or rebound.  Back: Spine midline and non-tender. No CVAT.  Skin: No rashes, abrasions or lacerations.  Lymph: No cervical lymphadenopathy.  Neuro: Awake, alert & oriented x 3. Moves all extremities symmetrically.

## 2019-01-29 ENCOUNTER — EMERGENCY (EMERGENCY)
Facility: HOSPITAL | Age: 24
LOS: 1 days | Discharge: HOME | End: 2019-01-29
Attending: EMERGENCY MEDICINE
Payer: SELF-PAY

## 2019-01-29 VITALS
SYSTOLIC BLOOD PRESSURE: 133 MMHG | WEIGHT: 179.9 LBS | HEIGHT: 64 IN | HEART RATE: 105 BPM | TEMPERATURE: 98 F | OXYGEN SATURATION: 98 % | DIASTOLIC BLOOD PRESSURE: 68 MMHG

## 2019-01-29 VITALS — OXYGEN SATURATION: 99 % | HEART RATE: 96 BPM

## 2019-01-29 DIAGNOSIS — Z90.49 ACQUIRED ABSENCE OF OTHER SPECIFIED PARTS OF DIGESTIVE TRACT: Chronic | ICD-10-CM

## 2019-01-29 PROCEDURE — 99284 EMERGENCY DEPT VISIT MOD MDM: CPT

## 2019-01-29 PROCEDURE — 99285 EMERGENCY DEPT VISIT HI MDM: CPT | Mod: 25

## 2019-01-29 PROCEDURE — 94640 AIRWAY INHALATION TREATMENT: CPT

## 2019-01-29 RX ORDER — ALBUTEROL 90 UG/1
2 AEROSOL, METERED ORAL
Qty: 1 | Refills: 1 | OUTPATIENT
Start: 2019-01-29

## 2019-01-29 RX ORDER — IPRATROPIUM/ALBUTEROL SULFATE 18-103MCG
3 AEROSOL WITH ADAPTER (GRAM) INHALATION
Qty: 0 | Refills: 0 | Status: COMPLETED | OUTPATIENT
Start: 2019-01-29 | End: 2019-01-29

## 2019-01-29 RX ORDER — ALBUTEROL 90 UG/1
2 AEROSOL, METERED ORAL
Qty: 1 | Refills: 3 | OUTPATIENT
Start: 2019-01-29

## 2019-01-29 RX ADMIN — Medication 3 MILLILITER(S): at 12:06

## 2019-01-29 RX ADMIN — Medication 3 MILLILITER(S): at 11:34

## 2019-01-29 RX ADMIN — Medication 3 MILLILITER(S): at 10:53

## 2019-01-29 RX ADMIN — Medication 50 MILLIGRAM(S): at 10:53

## 2019-01-29 NOTE — ED STATDOCS - PROGRESS NOTE DETAILS
PA Note: Pt evaluated by intake physician. HPI/ROS/PE as noted above. Will follow up plan per intake physician and continue to assess patient.  PCP: Malcolm Elise PA Note: s/p nebx1 b/l inspiratory and expiratory wheezes. PA Note: s/p nebx3. Significant improvement, mild inspiratory/expiratory wheezes b/l. Pt reports improvement, but states is anxious. PA Note: Patient provided ample opportunity to ask questions which were answered to the fullest extent. Patient verbalized understanding and agreement of plan.

## 2019-01-29 NOTE — ED STATDOCS - OBJECTIVE STATEMENT
22 yo female hx of asthma, with multiple recent visits this past week for similar; had an exacerbation due to cleaning products, required BiPAP and admission; was d/c'd on prednisone albuterol and maintenance inhaler, but unable to fill any of it due to financial and lack of insurance; same circumstances yesterday, treated for exacerbation, felt well for 24hrs but unable to fill any meds; today with 1 day of increased SOB and wheeze; no chest pain; no fever, no leg pain/swelling

## 2019-01-29 NOTE — ED ADULT TRIAGE NOTE - CHIEF COMPLAINT QUOTE
dry non productive cough since last night. Was seen here yesterday and discharged also. Has asthma and wheezing now.

## 2019-01-29 NOTE — ED ADULT NURSE NOTE - OBJECTIVE STATEMENT
pt a&o x 3. Pt c/o dry non productive cough since last night while asleep. Was seen here yesterday and discharged also. Has asthma and wheezing now. Pt denies SOB, sharp substernal pain.

## 2019-01-29 NOTE — ED ADULT NURSE NOTE - CHPI ED NUR SYMPTOMS NEG
no body aches/no cough/no diaphoresis/no fever/no hemoptysis/no edema/no headache/no shortness of breath

## 2019-08-01 NOTE — DISCHARGE NOTE ADULT - NSFTFCONTACT3DT_GEN_ALL_CORE
[address] [place] [address]                                 PROCEDURE NOTE    PATIENT NAME: Jessie Blue                  :        1957  MED REC NO:   [id number]                           ROOM:  ACCOUNT NO:   [de-identified]                           ADMIT DATE: 2019  PROVIDER:     Jerel Berry. Breanna Lipscomb M.D.    Fannyluis fernando Creamer:  2019    PROCEDURE TYPE:  Colonoscopy. INSTRUMENT:  Olympus video colonoscope. MEDICATIONS:  Propofol. BRIEF HISTORY:  The patient is a 72-year-old with history of adenomatous  colon polyps, who presents for high-risk screening colonoscopy. The  procedure was discussed with her. Following discussion, she expressed  understanding and did wish to proceed. DESCRIPTION OF PROCEDURE:  The patient arrived to [facility] as an outpatient. She was placed on the  appropriate monitoring devices. She was placed in the left lateral  decubitus position. Sedation was provided by the nurse anesthetist  using propofol. Following performance of unremarkable digital rectal  exam, the Olympus video colonoscope was inserted gently through the anal  canal and into the rectum. The scope was advanced to the length of  colon into the base of the cecum, identified by the presence of  appendiceal orifice, cecal strap, and ileocecal valve. Cecum was  carefully inspected. This was normal.  The ileocecal valve was normal.   The scope was advanced across the valve into the distal ileum. Visualized portion normal.  Brought back into the base of the cecum,  then up to the ascending colon, across hepatic flexure, through the  transverse colon, across splenic flexure down to the descending colon,  through the sigmoid colon, across the rectosigmoid junction into the  rectum. At the hepatic flexure, there was a 2-mm polyp, biopsied, and  removed with cold forceps.   In the 24-Jan-2019

## 2020-06-29 NOTE — ED STATDOCS - ATTENDING CONTRIBUTION TO CARE
Detail Level: Zone
Plan: Consider Tetrix at follow up if needed
Initiate Treatment: Betamethasone augmented ointment BID for flares, can occlude overnight prn severe flares
Continue Regimen: La Roche Posay moisturizer- Lipikar AP\\nOccluding hands at night with medication/Vaseline
Modify Regimen: Moisturize after every hand washing
Otc Regimen: L-Histidine supplement 4gm once a day
Discontinue Regimen: Triamcinolone 0.1% cream
I, Reza Soriano, performed the initial face to face bedside interview with this patient regarding history of present illness, review of symptoms and relevant past medical, social and family history.  I completed an independent physical examination.  I was the initial provider who evaluated this patient. I have signed out the follow up of any pending tests (i.e. labs, radiological studies) to the ACP.  I have communicated the patient’s plan of care and disposition with the ACP.

## 2021-07-01 ENCOUNTER — APPOINTMENT (OUTPATIENT)
Dept: DISASTER EMERGENCY | Facility: OTHER | Age: 26
End: 2021-07-01
Payer: COMMERCIAL

## 2021-07-01 PROCEDURE — 0012A: CPT

## 2022-09-26 NOTE — ED ADULT NURSE NOTE - CARDIO ASSESSMENT
[FreeTextEntry1] : Documented by Rajni Trejo acting as a scribe for Dr. Davis on 09/26/2022.\par \par All medical record entries made by the Scribe were at my, Dr. Davis direction and personally dictated by me on 09/26/2022. I have reviewed the chart and agree that the record accurately reflects my personal performances of the history, physical exam, assessment and plan. I have also personally directed, reviewed, and agree with the discharge instructions.\par  WDL

## 2024-04-02 ENCOUNTER — APPOINTMENT (OUTPATIENT)
Dept: DERMATOLOGY | Facility: CLINIC | Age: 29
End: 2024-04-02

## 2024-06-21 NOTE — ED ADULT NURSE NOTE - CADM POA URETHRAL CATHETER
Called phone #: [phone] to speak with patient to confirm appointment. Patient verbally confirmed appointment.   No

## 2024-10-08 NOTE — ED ADULT TRIAGE NOTE - WEIGHT IN LBS
Hub staff attempted to follow warm transfer process and was unsuccessful     Caller: Ofelia RODRIGUEZ    Relationship to patient: Self    Best call back number: [phone]    Patient is needing: PLEASE CALL PATIENT BACK TO [facility] 4 WEEK F/U     
Left voicemail to call back to set up 4 week appt  
[phone]

## 2025-03-28 NOTE — DISCHARGE NOTE ADULT - NS AS DC AMI YN
Suboxone (Buprenorphine) as potential option for pain control     Talk with insurance about BMI criteria for weight loss meds if possible (Zepbound or Wegovy)    no